# Patient Record
Sex: FEMALE | Race: WHITE | URBAN - METROPOLITAN AREA
[De-identification: names, ages, dates, MRNs, and addresses within clinical notes are randomized per-mention and may not be internally consistent; named-entity substitution may affect disease eponyms.]

---

## 2018-01-20 ENCOUNTER — HOSPITAL ENCOUNTER (OUTPATIENT)
Dept: MEDSURG UNIT | Facility: HOSPITAL | Age: 73
End: 2018-01-21
Attending: NEUROLOGICAL SURGERY | Admitting: NEUROLOGICAL SURGERY

## 2018-01-20 LAB
ABS NEUT (OLG): 10.16 X10(3)/MCL (ref 2.1–9.2)
ALBUMIN SERPL-MCNC: 3.6 GM/DL (ref 3.4–5)
ALBUMIN/GLOB SERPL: 0.9 RATIO (ref 1.1–2)
ALP SERPL-CCNC: 66 UNIT/L (ref 38–126)
ALT SERPL-CCNC: 31 UNIT/L (ref 12–78)
APPEARANCE, UA: CLEAR
APTT PPP: 22.3 SECOND(S) (ref 24.8–36.9)
AST SERPL-CCNC: 20 UNIT/L (ref 15–37)
BACTERIA SPEC CULT: NORMAL /HPF
BASOPHILS # BLD AUTO: 0 X10(3)/MCL (ref 0–0.2)
BASOPHILS NFR BLD AUTO: 0 %
BILIRUB SERPL-MCNC: 0.6 MG/DL (ref 0.2–1)
BILIRUB UR QL STRIP: NEGATIVE
BILIRUBIN DIRECT+TOT PNL SERPL-MCNC: 0.1 MG/DL (ref 0–0.5)
BILIRUBIN DIRECT+TOT PNL SERPL-MCNC: 0.5 MG/DL (ref 0–0.8)
BUN SERPL-MCNC: 20 MG/DL (ref 7–18)
CALCIUM SERPL-MCNC: 8.9 MG/DL (ref 8.5–10.1)
CHLORIDE SERPL-SCNC: 105 MMOL/L (ref 98–107)
CO2 SERPL-SCNC: 27 MMOL/L (ref 21–32)
COLOR UR: YELLOW
CREAT SERPL-MCNC: 0.67 MG/DL (ref 0.55–1.02)
EOSINOPHIL # BLD AUTO: 0.1 X10(3)/MCL (ref 0–0.9)
EOSINOPHIL NFR BLD AUTO: 1 %
ERYTHROCYTE [DISTWIDTH] IN BLOOD BY AUTOMATED COUNT: 12.9 % (ref 11.5–17)
GLOBULIN SER-MCNC: 3.9 GM/DL (ref 2.4–3.5)
GLUCOSE (UA): NEGATIVE
GLUCOSE SERPL-MCNC: 111 MG/DL (ref 74–106)
HCT VFR BLD AUTO: 44.2 % (ref 37–47)
HGB BLD-MCNC: 15 GM/DL (ref 12–16)
HGB UR QL STRIP: NEGATIVE
INR PPP: 0.89 (ref 0–1.27)
KETONES UR QL STRIP: NEGATIVE
LEUKOCYTE ESTERASE UR QL STRIP: NEGATIVE
LYMPHOCYTES # BLD AUTO: 1.5 X10(3)/MCL (ref 0.6–4.6)
LYMPHOCYTES NFR BLD AUTO: 12 %
MCH RBC QN AUTO: 32.5 PG (ref 27–31)
MCHC RBC AUTO-ENTMCNC: 33.9 GM/DL (ref 33–36)
MCV RBC AUTO: 95.9 FL (ref 80–94)
MONOCYTES # BLD AUTO: 1 X10(3)/MCL (ref 0.1–1.3)
MONOCYTES NFR BLD AUTO: 8 %
NEUTROPHILS # BLD AUTO: 10.16 X10(3)/MCL (ref 2.1–9.2)
NEUTROPHILS NFR BLD AUTO: 79 %
NITRITE UR QL STRIP: NEGATIVE
PH UR STRIP: 6 [PH] (ref 5–9)
PLATELET # BLD AUTO: 281 X10(3)/MCL (ref 130–400)
PMV BLD AUTO: 9.1 FL (ref 9.4–12.4)
POTASSIUM SERPL-SCNC: 4.5 MMOL/L (ref 3.5–5.1)
PROT SERPL-MCNC: 7.5 GM/DL (ref 6.4–8.2)
PROT UR QL STRIP: NEGATIVE
PROTHROMBIN TIME: 12.3 SECOND(S) (ref 12.2–14.7)
RBC # BLD AUTO: 4.61 X10(6)/MCL (ref 4.2–5.4)
RBC #/AREA URNS HPF: NORMAL /[HPF]
SODIUM SERPL-SCNC: 141 MMOL/L (ref 136–145)
SP GR UR STRIP: 1.02 (ref 1–1.03)
SQUAMOUS EPITHELIAL, UA: NORMAL
UROBILINOGEN UR STRIP-ACNC: 0.2
WBC # SPEC AUTO: 12.9 X10(3)/MCL (ref 4.5–11.5)
WBC #/AREA URNS HPF: NORMAL /[HPF]

## 2018-01-21 LAB
BUN SERPL-MCNC: 19 MG/DL (ref 7–18)
BUN SERPL-MCNC: 20 MG/DL (ref 7–18)
CALCIUM SERPL-MCNC: 7.8 MG/DL (ref 8.5–10.1)
CALCIUM SERPL-MCNC: 7.8 MG/DL (ref 8.5–10.1)
CHLORIDE SERPL-SCNC: 105 MMOL/L (ref 98–107)
CHLORIDE SERPL-SCNC: 107 MMOL/L (ref 98–107)
CO2 SERPL-SCNC: 27 MMOL/L (ref 21–32)
CO2 SERPL-SCNC: 30 MMOL/L (ref 21–32)
CREAT SERPL-MCNC: 0.57 MG/DL (ref 0.55–1.02)
CREAT SERPL-MCNC: 0.63 MG/DL (ref 0.55–1.02)
GLUCOSE SERPL-MCNC: 114 MG/DL (ref 74–106)
GLUCOSE SERPL-MCNC: 88 MG/DL (ref 74–106)
GROUP & RH: NORMAL
POTASSIUM SERPL-SCNC: 3.9 MMOL/L (ref 3.5–5.1)
POTASSIUM SERPL-SCNC: 4.1 MMOL/L (ref 3.5–5.1)
SODIUM SERPL-SCNC: 142 MMOL/L (ref 136–145)
SODIUM SERPL-SCNC: 142 MMOL/L (ref 136–145)

## 2022-04-30 NOTE — H&P
Patient:   Geovanna Klein            MRN: [id number]            FIN: [id number]               Age:   72 years     Sex:  Female     :  1945   Associated Diagnoses:   Intervertebral disc disorders with radiculopathy, lumbar region   Author:   Cam Her MD      Preoperative Information   Indication for surgery:  Herniated disc.         Associated symptoms: Muscle weakness, Paresthesia, Sensory changes.    Accompanied by:  Significant other.    Source of history:  Self.    Referral source:  Self.    History limitation:  None.       Chief Complaint   2018 17:39 CST      having surgery in the morning with dr her.     Severe left hip and leg pain with numbness and weakness secondary to a large left L3-L4 foraminal disc herniation      Review of Systems   Constitutional:  Negative.    Eye:  Negative.    Ear/Nose/Mouth/Throat:  Negative.    Respiratory:  Cough.    Cardiovascular:  Negative.    Gastrointestinal:  Negative.    Genitourinary:  Negative.    Hematology/Lymphatics:  Negative.    Endocrine:  Negative.    Immunologic:  Negative.    Musculoskeletal:          Back pain: On the left side, The pain is severe, Radiating.         Muscle weakness: Proximal, left lower extremity .    Integumentary:  Negative.    Neurologic:  Numbness, Tingling.    Psychiatric:  Negative.       Health Status   Allergies:    Allergic Reactions (Selected)  No Known Medication Allergies   Current medications:  (Selected)   Inpatient Medications  Ordered  Lactated Ringers 1000ml 1,000 mL: 1,000 mL, 1,000 mL, IV, 75 mL/hr, start date 18 17:54:00 CST   Problem list:    All Problems  Hypercholesterolemia / SNOMED CT 95186113 / Confirmed  Hypothyroid / SNOMED CT 680362159 / Confirmed  Tobacco user / SNOMED CT 110666885 / Confirmed      Histories   Past Medical History:    No active or resolved past medical history items have been selected or recorded.   Family History:    No family history items have been selected  or recorded.   Procedure history:    Hysterectomy ([id number]).      Physical Examination      Vital Signs (last 24 hrs)_____  Last Charted___________  Temp Oral     36.9 DegC  (JAN 20 17:39)  Heart Rate Peripheral   H 102bpm  (JAN 20 17:39)  Resp Rate         18 br/min  (JAN 20 17:39)  SBP      129 mmHg  (JAN 20 17:39)  DBP      84 mmHg  (JAN 20 17:39)  SpO2      96 %  (JAN 20 17:39)     General:  Alert and oriented, Mild distress.    Eye:  Pupils are equal, round and reactive to light, Extraocular movements are intact, Normal conjunctiva.    HENT:  Normocephalic, Normal hearing, Oral mucosa is moist.    Neck:  Supple, Non-tender.    Respiratory:  Lungs are clear to auscultation, Respirations are non-labored, Breath sounds are equal, Symmetrical chest wall expansion, No chest wall tenderness.    Cardiovascular:  Normal rate, Regular rhythm.    Gastrointestinal:  Soft, Non-tender, Non-distended, Normal bowel sounds.    Genitourinary:  No costovertebral angle tenderness.    Musculoskeletal:          Mobility/ gait: Able to walk with minimal assistance, Dragging left foot.         Spine/torso exam: Lumbar ( Left, Moderate, Tenderness, Pain, Numbness, Tingling, Strength  4 /5, left lower extremity  ).    Integumentary:  Warm, Dry, Pink.    Neurologic:  Alert, Oriented, Cranial Nerves II-XII are grossly intact, Normal deep tendon reflexes.         Orientation: Oriented X 4.         Patterns of paralysis: On the left, Lower extremity, At the level of  4/5 .    Psychiatric:  Cooperative, Appropriate mood & affect, Normal judgment, Non-suicidal.       Review / Management   Differential diagnosis:  Ms. Klein had the abrupt onset of left lower extremity pain numbness and weakness consistent with an L3 distribution in mid November.  MRI of the lumbar spine from 11/27/17 shows what appears to be a very large foraminal disc herniation on the left with potentially some extruded intraspinal disc material against the superior  pedicle.  Her pain seemed to improve significantly with rest and conservative treatment.  However last week her pain increased dramatically and now she is unable to attain a comfortable position and has difficulty ambulating due to left lower extremity pain, numbness and weakness.  She did have a left L3-L4 transforaminal epidural steroid injection last week by Dr. Mccullough, but that had no effect.  She also started a Medrol Dosepak recently with no effect.  The pain is intractable and unresponsive to 100 mg of Demerol and 600 milligrams of Neurontin 3 times a day orally.  Options were discussed at length with the patient.  I don't see her getting better without surgery.  However, because of the lobular shape of the foraminal lesion, I would like to see an MRI with and without contrast to be sure that this isn't a nerve sheath tumor.  The mode of onset is not really consistent with this, but it does have an unusual appearance on MRI.  This will also help better define any intraspinal free fragments if present..    Course:  Worsening.       Impression and Plan   Diagnosis     Intervertebral disc disorders with radiculopathy, lumbar region ([id number] M51.16).

## 2022-04-30 NOTE — OP NOTE
Patient:   Geovanna Klein            MRN: [id number]            FIN: [id number]               Age:   72 years     Sex:  Female     :  1945   Associated Diagnoses:   None   Author:   Cam Valadez MD      Operative Note   DATE OF OPERATION:  2018    PREOPERATIVE DIAGNOSIS:  1.  Left L3-4 far lateral herniated intervertebral disc    POSTOPERATIVE DIAGNOSIS:  1.  Left L3-4 far lateral herniated intervertebral disc    SURGEON:  Cam Valadez M.D.   ASSISTANT: Vannesa Yeager CST    PROCEDURE:  1.  Left L3-4 far lateral microdiscectomy with the [employer] MD tubular retractor system  2.  Microdissection for spinal procedure    ANESTHESIA:  General endotracheal    BLOOD LOSS:  20 cc    SPECIMEN(s):  Disc    COMPLICATIONS:  None    HISTORY:  Geovanna Klein is a 72-year-old woman with recent onset of severe intractable left lower extremity pain consistent with an L3 distribution.  Imaging studies showed a large distal foraminal/far lateral disc herniation at L3 4 on the left.  Her pain had improved for a period of time but then now returned and was intractable the last week.  Options were discussed and the surgery was elected.. The patient understood and accepted the nature of this surgery as well as its attendant risks.    FINDINGS:  As expected there were several extruded fragments in the extraforaminal space so with the nerve quite tight over those fragments.  Once these were removed, the nerve was much more mobile and then we could go both rostral and caudal to the nerve root to remove further subligamentous herniated fragments as well as a significant amount of degenerated disc material from within the interspace.. There was excellent decompression of the exiting nerve root at the completion of the procedure.  40 mg of Depo-Medrol were mixed with 2 mL of Surgi-Alessandro and this mixture was placed over the nerve root.  20 mL of Exparell mixed with 20 mL of 0.5% Marcaine without epinephrine were  injected into the paraspinal musculature as well as subcutaneous tissue at the completion of the procedure.  The tract and was irrigated after that to remove any Exparel leakage.  A drain was not utilized.  The patient tolerated the procedure well.    PROCEDURE IN DETAIL:  After endotracheal intubation and induction of general anesthesia, the patient was placed in the prone position on the spinal frame with pressure points appropriately padded.  The frame was cranked up and the back was prepped and draped in the usual fashion.  The patient received intravenous antibiotics prior to the start of the procedure.  The C-arm was used to guide the placement of the initial incision.    A 2 cm incision was made through the skin, subcutaneous tissues and fascia on the appropriate side approximately 4 cm from the midline after infiltrating the skin and muscle with 1/4% Marcaine containing 1/200,000 epinephrine.  Then progressive dilators were inserted down to the lateral portion of the facet at its junction with the upper transverse process. A 20 mm guide tube was put into place and positioned appropriately according to the C-arm.  Then the operating microscope was brought into place.  Muscle was cleared off of the lateral portion of the facet and both transverse processes.  The intertransverse septum was identified and divided.  Careful dissection down to the nerve root was carried out noting significant distortion and compression of the nerve root.  Cookson micro-instruments were used to both explore superomedial and inferolateral to the nerve root to find the offending disc fragment.  Once this was identified then the fragment was removed piecemeal until there was no further impingement.  Exploration of the foramen with various hooks was carried out to ensure that there was no further impingement of the exiting nerve root.  There was significant opening in the annulus so further degenerative disc material was removed from  within the interspace.  The endplates were not curetted. Please refer to the Findings Section of this report for specific details regarding this patient.  The disc space and wound were irrigated copiously with antibiotic irrigating solution.  40 mg of Depo-Medrol was mixed with 2 mL of Surgi-Alessandro and this mixture was then placed over the nerve root.  20 mL of 0.5% Marcaine were mixed with 20 mL of Exparel and this was injected into the paraspinal musculature and subcutaneously.  Attempt was made to irrigate out any fluid that had leaked out of the muscle.    The guide tube was removed slowly in a rotating fashion with bipolar cautery of the soft tissues and then the fascia was closed with 0 Vicryl and the subcutaneous tissue was closed with 2-0 Vicryl in separate layers.  Dermabond skin glue was used for the skin edges and the patient was returned to the supine position.  The patient was then taken to the post anesthetic care unit in satisfactory condition with correct sponge and needle counts.

## 2024-03-30 RX ORDER — HYDROCODONE POLISTIREX AND CHLORPHENIRAMINE POLISTIREX 10; 8 MG/5ML; MG/5ML
5 SUSPENSION, EXTENDED RELEASE ORAL EVERY 12 HOURS PRN
Qty: 70 ML | Refills: 0 | Status: SHIPPED | OUTPATIENT
Start: 2024-03-30